# Patient Record
(demographics unavailable — no encounter records)

---

## 2025-01-13 NOTE — PHYSICAL EXAM
[FreeTextEntry1] : Well-developed, well-nourished male in no acute distress.  Patient is awake and alert and appears to be resting comfortably.  The head is normocephalic, atraumatic with full range of motion of the cervical spine with no pain.   Eyes are clear with no sclera abnormalities.   Ears, nose and mouth are clear.    The child is moving all limbs spontaneously.    Full range of motion of bilateral upper extremities.   The motor exam is grossly 5/5 of bilateral shoulders, elbows, wrists, and hands.   The pulses are 2+ at both wrists.   No evidence of contractures  The child has full range of motion of bilateral hips, knees, ankles, and feet.  Physiologic genu varum noted Wide symmetric abduction  Mildly asymmetric posterior thigh crease noted No apparent limb length discrepancy Negative Galeazzi.  Prone  IR: 30 degrees bilaterally Prone  ER: 75 degrees bilaterally  Pulses are 2+ at both feet. Normal alignment of bilateral feet, flex well and passively correctable to neutral, no significant metatarsus adductus.  Bilateral ankle dorsiflexion to +20.  Sensation is grossly intact in bilateral upper and lower extremities. Withdraws appropriately to light pinch. There are no palpable masses, warmth, or tenderness in bilateral upper and lower extremities.  Spine is grossly midline and shows no deformity, calderon of hair or dimples.

## 2025-01-13 NOTE — ASSESSMENT
[FreeTextEntry1] : 8 month old male with genu varum and mildly asymmetric thigh creases.  -Today's visit included obtaining the history from the child's parent, due to the child's age, the child could not be considered a reliable historian, requiring the parent to act as an independent historian. - AP/frog-leg lateral radiographs of bilateral hips were obtained and independently reviewed during today's visit.  Bilateral femoral heads are well-seated within the acetabuli.  Bilateral Shenton's line is intact.  Bilateral femoral ossification centers are present and symmetric.  Bilateral acetabular indices are approximately 25 degrees.  -The condition, natural history, and prognosis were explained to the patient and family. The clinical findings were reviewed with the family. -Clinically, he has physiologic genu varum and asymmetric posterior thigh creases -Normal progression of the lower extremity alignment was discussed with the family today.  Children are initially of bowlegged which continues until age 2.  Alignment then progresses to a valgus alignment, most significantly by age 4.  After age 4 alignment returns to a more neutral adult alignment around age 7/8. - Recommendation at this time is to take monthly pictures of him in just the diaper to monitor the progression of his lower extremity alignment. They should follow-up in approximately 6 months for repeat clinical evaluation and review of their photos  All questions and concerns were addressed today. Parent and patient verbalize understanding and agree with plan of care.   I, Ute Armando, have acted as a scribe and documented the above information for Dr. Cerda.   This note was created using Dragon Voice Recognition Software and may have been partially created using opvizor software which was then reviewed and edited to the best of my ability. Sporadic inaccurate translation may have occurred. If there are any questions about content of the note, please contact the office for clarification.

## 2025-01-13 NOTE — REVIEW OF SYSTEMS
[Change in Activity] : no change in activity [Itching] : no itching [Redness] : no redness [Murmur] : no murmur [Wheezing] : no wheezing [Bladder Infection] : no bladder infection [Joint Pains] : no arthralgias [Joint Swelling] : no joint swelling

## 2025-01-13 NOTE — DEVELOPMENTAL MILESTONES
[See scanned document for history] : See scanned document for history [Roll Over: ___ Months] : Roll Over: [unfilled] months [Sit Up: ___ Months] : Sit Up: [unfilled] months [Pull Self to Stand ___ Months] : Pull self to stand: [unfilled] months

## 2025-01-13 NOTE — DATA REVIEWED
[de-identified] : AP/frog-leg lateral radiographs of bilateral hips were obtained and independently reviewed during today's visit.  Bilateral femoral heads are well-seated within the acetabuli.  Bilateral Shenton's line is intact.  Bilateral femoral ossification centers are present and symmetric.  Bilateral acetabular indices are approximately 25 degrees.

## 2025-01-13 NOTE — HISTORY OF PRESENT ILLNESS
[FreeTextEntry1] :  is an 8-month-old male with physiologic genu varum.  Per report he was born full-term vaginal delivery.  He was never breech in utero.  He is his mother's third baby.  She reports she noted a bowing of the legs at 5 months old.  She was seen by her pediatrician who recommended orthopedic evaluation.  Today, she continues to report bowing of the lower extremities.  She denies any difficulty with diaper changes.  He is meeting all his milestones on time.  She notes that his older brother also had bowing of the lower extremities which improved in time.  She presents today for initial evaluation of his lower extremity alignment.